# Patient Record
Sex: FEMALE | Race: WHITE | NOT HISPANIC OR LATINO | Employment: FULL TIME | URBAN - METROPOLITAN AREA
[De-identification: names, ages, dates, MRNs, and addresses within clinical notes are randomized per-mention and may not be internally consistent; named-entity substitution may affect disease eponyms.]

---

## 2024-04-01 ENCOUNTER — OFFICE VISIT (OUTPATIENT)
Dept: URGENT CARE | Facility: URGENT CARE | Age: 59
End: 2024-04-01
Payer: COMMERCIAL

## 2024-04-01 ENCOUNTER — NURSE TRIAGE (OUTPATIENT)
Dept: NURSING | Facility: CLINIC | Age: 59
End: 2024-04-01

## 2024-04-01 VITALS
HEART RATE: 79 BPM | SYSTOLIC BLOOD PRESSURE: 133 MMHG | TEMPERATURE: 98 F | RESPIRATION RATE: 16 BRPM | OXYGEN SATURATION: 97 % | DIASTOLIC BLOOD PRESSURE: 83 MMHG | WEIGHT: 173.8 LBS
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
## 2024-04-01 DIAGNOSIS — L03.012 CELLULITIS OF FINGER OF LEFT HAND: ICD-10-CM

## 2024-04-01 PROCEDURE — 99203 OFFICE O/P NEW LOW 30 MIN: CPT | Performed by: EMERGENCY MEDICINE

## 2024-04-01 NOTE — TELEPHONE ENCOUNTER
Nurse Triage SBAR    Is this a 2nd Level Triage? YES, LICENSED PRACTITIONER REVIEW IS REQUIRED    Situation:Dog bite      Assessment: Thumb was bitten by dog last night, right below nail. She cleansed and put Hydrogen peroxide and a band aid on it but it is still bleeding     Protocol Recommended Disposition: Gave advise to go to  in [place]     Do Joseph RN on 4/1/2024 at 10:03 AM      Reason for Disposition   Bleeding won't stop after 10 minutes of direct pressure (using correct technique)    Additional Information   Negative: Major bleeding (actively dripping or spurting) that can't be stopped   Negative: Sounds like a life-threatening emergency to the triager    Protocols used: Animal Bite-A-[place]

## 2024-04-01 NOTE — PROGRESS NOTES
Assessment & Plan     Diagnosis:    ICD-10-CM    1. Dog bite, initial encounter  W54.0XXA amoxicillin-clavulanate (AUGMENTIN) 875-125 MG tablet    left thumb      2. Cellulitis of finger of left hand  L03.012 amoxicillin-clavulanate (AUGMENTIN) 875-125 MG tablet          Medical Decision Making:  Caroline Rodríguez is a 58 year old female who presents for evaluation of a dog bite to left thumb.  The workup here in the ED shows no signs of compartment syndrome, significant lacerations, tendon or bone injury.  No signs of foreign body or dog teeth in wound.  Dog is hers so will have them observe for signs of rabies; no rabies shots indicated here.  The wounds were scrubbed and washed out here.  Will start Augmentin and have them monitor for worsening infection. No signs of joint infection, tenosynovitis, lymphangitis or abscess at this juncture.       Dionisio Molina PA-C  [facility]    Subjective     Caroline Rodríguez is a 58 year old female who presents to clinic today for the following health issues:  Chief Complaint   Patient presents with    Dog Bite     Left thumb; happened last night about 6pm       HPI  Patient reports that last around 6 PM her dog bit her left thumb after she was playing keep away the bone, she washed it out and put some hydroperoxide on it.  Has had some bleeding since, this seems to have stopped.  She now notes the pain has increased, the thumb is more red and swollen.  There is been no red streak going into the hand or wrist, difficulties moving at the wrist or base of the thumb only over the joint of the thumb where she was bit.  No fevers or other concerns. Tetanus up to date from 2018.    Review of Systems    See HPI    Objective      Vitals: /83   Pulse 79   Temp 98  F (36.7  C) (Tympanic)   Resp 16   Wt 78.8 kg (173 lb 12.8 oz)   SpO2 97%     Patient Vitals for the past 24 hrs:   BP Temp Temp src Pulse Resp SpO2 Weight   04/01/24 1231 133/83 98  F (36.7   C) Tympanic 79 16 97 % 78.8 kg (173 lb 12.8 oz)       Vital signs reviewed by: Dionisio Molina PA-C    Physical Exam   Constitutional: Patient is alert and cooperative. No acute distress.  Neurological: Alert and oriented x3. Strength and sensation are intact in the left thumb.  MSK/Skin: 1cm laceration over the dorsal aspect of the IP joint of left thumb. Overlying erythema, swelling and warmth. No fluctuance or areas of pointing.  No lymphangitis.  Range of motion intact at the MCP and wrist.  Range of motion limited at the IP joint of the thumb secondary to pain. No active bleeding or purulent drainage.  Psychiatric:The patient has a normal mood and affect.       Dionisio Molina PA-C, April 1, 2024